# Patient Record
Sex: FEMALE | Race: BLACK OR AFRICAN AMERICAN | URBAN - METROPOLITAN AREA
[De-identification: names, ages, dates, MRNs, and addresses within clinical notes are randomized per-mention and may not be internally consistent; named-entity substitution may affect disease eponyms.]

---

## 2017-01-17 ENCOUNTER — MEDICAL CORRESPONDENCE (OUTPATIENT)
Dept: HEALTH INFORMATION MANAGEMENT | Facility: CLINIC | Age: 32
End: 2017-01-17

## 2017-01-23 ENCOUNTER — OFFICE VISIT (OUTPATIENT)
Dept: DERMATOLOGY | Facility: CLINIC | Age: 32
End: 2017-01-23

## 2017-01-23 DIAGNOSIS — E55.9 VITAMIN D DEFICIENCY: ICD-10-CM

## 2017-01-23 DIAGNOSIS — L60.8 MELANONYCHIA STRIATA: Primary | ICD-10-CM

## 2017-01-23 DIAGNOSIS — L60.8 MELANONYCHIA STRIATA: ICD-10-CM

## 2017-01-23 LAB
DEPRECATED CALCIDIOL+CALCIFEROL SERPL-MC: ABNORMAL UG/L (ref 20–75)
VIT B12 SERPL-MCNC: 469 PG/ML (ref 193–986)

## 2017-01-23 ASSESSMENT — PAIN SCALES - GENERAL: PAINLEVEL: NO PAIN (0)

## 2017-01-23 NOTE — PROGRESS NOTES
"[facility] Dermatology Note    Dermatology Problem List:  1. Melanonychia striata, Faint linear hyperpigmentation x 3 nails, labs ordered     CC:   Chief Complaint   Patient presents with     Derm Problem     Joan is here today for a fingernail check. Joan notes \" stripes.\"     Date of Service: Jan 23, 2017    History of Present Illness:  Ms. Joan Boyer is a 31 year old female who presents as a referral by Dr. Kolb an evaluation of fingernails. Today the patient reports that there are \"stripes\" on her nails. She states that she noticed it within the last month. It started on one nail and now its on a few. She has not noticed any change in the nail texture. She denies any issues with rashes or itching. She also notes that she has had tingling and numbness in her hands and her feet which comes and go. 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 Denies any history of skin diseases, expect an episode of impetigo when she was younger. She is not on any special diet and is not taking any supplementation. The patient reports no other lesions of concern at this time.    Otherwise, the patient reports no painful, bleeding, nonhealing, or pruritic lesions, and denies new or changing moles.    Past Medical History:   There is no problem list on file for this patient.    No past medical history on file.  No past surgical history on file.     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     Family History:  No family history of skin issues, except for keloids.     Medications:  Current Outpatient Prescriptions   Medication Sig Dispense Refill     levonorgestrel (MIRENA) 20 MCG/24HR IUD 1 each by Intrauterine route       Allergies:  Allergies   Allergen Reactions     Doxycycline GI Disturbance     Review of Systems:  - Skin: As above in HPI. No additional skin concerns.    Physical exam:  Vitals: There were no vitals taken for " this visit.  GEN: This is a well developed, well-nourished female in no acute distress, in a pleasant mood.      SKIN: Focused examination of the sun exposed areas, face, head, neck, arms. hands and feet and nails was performed. Significant for:   - Onychodystrophy to the 5th toenails.  - Faint linear hyperpigmentation on the left 2nd toe medially, right 4th digit, left 3rd digit  - No other lesions of concern on areas examined.     Impression/Plan:  1. Melanoncychia striata, Faint linear hyperpigmentation x 3 nails  -Reassurance. Discussed benign nature. ABCDs of melanoma were discussed and self skin checks were advised.  Return to clinic if noticing concerning features, (hyperpigmentation to nail fold, asymmetric of pigmentation, rapid growth etc).  - Pictures obtained at today's visit.   - Labs obtained today: Vitamin D, Vitamin B12, Vitamin B1 whole blood, Cortisol, NILESH  -This showed a vitamin D level less than 13, recommend weekly supplementation with 50,000 IU x 8 weeks. This was sent to her pharmacy.   -Follow up with primary care to follow this.     2. Onychodystrohpy to the 5th toenails, likely traumatic.   Follow-up prn pending lab results.    Staff Involved:  Staff Only    Scribe Disclosure:   I, Eduin Crocker, am serving as a scribe to document services personally performed by Sridevi Denny, based on data collection and the provider's statements to me.    Provider Disclosure:   I agree with above History, Review of Systems, Physical exam and Plan. I have reviewed the content of the documentation and have edited it as needed. I have personally performed the services documented here and the documentation accurately represents those services and the decisions I have made.     All risks, benefits and alternatives were discussed with patient.  Patient is in agreement and understands the assessment and plan.  All questions were answered.  Sun Screen Education was given.   Return to Clinic as needed.   Sridevi  Eduin GUNN

## 2017-01-23 NOTE — MR AVS SNAPSHOT
After Visit Summary   1/23/2017    Joan Boyer    MRN: [id number]           Patient Information     Date Of Birth          1985        Visit Information        Provider Department      1/23/2017 1:00 PM Sridevi Denny PA-C [facility] Dermatology        Today's Diagnoses     Melanlouisechia striata    -  1        Follow-ups after your visit        Your next 10 appointments already scheduled     Jan 23, 2017  2:00 PM   LAB with  LAB   [facility] ([facility])    [address] [place] [id number] [phone]           Patient must bring picture ID.  Patient should be prepared to give a urine specimen  Please do not eat 10-12 hours before your appointment if you are coming in fasting for labs on lipids, cholesterol, or glucose (sugar).  Pregnant women should follow their Care Team instructions. Water with medications is okay. Do not drink coffee or other fluids.   If you have concerns about taking  your medications, please ask at office or if scheduling via Nveloped, send a message by clicking on Secure Messaging, Message Your Care Team.              Who to contact     Please call your clinic at [phone] to:    Ask questions about your health    Make or cancel appointments    Discuss your medicines    Learn about your test results    Speak to your doctor   If you have compliments or concerns about an experience at your clinic, or if you wish to file a complaint, please contact [facility] Patient Relations at [phone] or email us at [email or web address]         Additional Information About Your Visit        Work in [employer] Information     Nveloped is an electronic gateway that provides easy, online access to your medical records. With Nveloped, you can request a clinic appointment, read your test results, renew a prescription or communicate with your care team.     To sign up for Nveloped visit the  website at [email or web address]   You will be asked to enter the access code listed below, as well as some personal information. Please follow the directions to create your username and password.     Your access code is: [email or web address]  Expires: 2017  6:30 AM     Your access code will  in 90 days. If you need help or a new code, please contact your [facility] or call [phone] for assistance.        Care EveryWhere ID     This is your Care EveryWhere ID. This could be used by other organizations to access your [place] medical records  [id number]         Blood Pressure from Last 3 Encounters:   No data found for BP    Weight from Last 3 Encounters:   No data found for Wt              Today, you had the following     No orders found for display       Primary Care Provider    None Specified       No primary provider on file.        Thank you!     Thank you for choosing [facility]  for your care. Our goal is always to provide you with excellent care. Hearing back from our patients is one way we can continue to improve our services. Please take a few minutes to complete the written survey that you may receive in the mail after your visit with us. Thank you!             Your Updated Medication List - Protect others around you: Learn how to safely use, store and throw away your medicines at www.disposemymeds.org.          This list is accurate as of: 17  1:33 PM.  Always use your most recent med list.                   Brand Name Dispense Instructions for use    levonorgestrel 20 MCG/24HR IUD    MIRENA     1 each by Intrauterine route

## 2017-01-23 NOTE — NURSING NOTE
"Dermatology Rooming Note    Joan Boyer's goals for this visit include:   Chief Complaint   Patient presents with     Derm Problem     Joan is here today for a fingernail check. Joan notes \" stripes.\"     Roz Whitaker LPN  "

## 2017-01-23 NOTE — Clinical Note
"1/23/2017       RE: Joan Boyer  [address]    [place] [facility] [address]     Dear Colleague,    Thank you for referring your patient, Joan Boyer, to the [facility] DERMATOLOGY at [facility]. Please see a copy of my visit note below.    [facility] Dermatology Note    Dermatology Problem List:  1. Melanonychia striata, Faint linear hyperpigmentation x 3 nails, labs ordered     CC:   Chief Complaint   Patient presents with     Derm Problem     Joan is here today for a fingernail check. Joan notes \" stripes.\"     Date of Service: Jan 23, 2017    History of Present Illness:  Ms. Joan Boyer is a 31 year old female who presents as a referral by Dr. Kolb an evaluation of fingernails. Today the patient reports that there are \"stripes\" on her nails. She states that she noticed it within the last month. It started on one nail and now its on a few. She has not noticed any change in the nail texture. She denies any issues with rashes or itching. She also notes that she has had tingling and numbness in her hands and her feet which comes and go. 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 Denies any history of skin diseases, expect an episode of impetigo when she was younger. She is not on any special diet and is not taking any supplementation. The patient reports no other lesions of concern at this time.    Otherwise, the patient reports no painful, bleeding, nonhealing, or pruritic lesions, and denies new or changing moles.    Past Medical History:   There is no problem list on file for this patient.    No past medical history on file.  No past surgical history on file.     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     Family History:  No family history of skin issues, except for keloids.     Medications:  Current Outpatient Prescriptions   Medication Sig Dispense " Refill     levonorgestrel (MIRENA) 20 MCG/24HR IUD 1 each by Intrauterine route       Allergies:  Allergies   Allergen Reactions     Doxycycline GI Disturbance     Review of Systems:  - Skin: As above in HPI. No additional skin concerns.    Physical exam:  Vitals: There were no vitals taken for this visit.  GEN: This is a well developed, well-nourished female in no acute distress, in a pleasant mood.      SKIN: Focused examination of the sun exposed areas, face, head, neck, arms. hands and feet and nails was performed. Significant for:   - Onychodystrophy to the 5th toenails.  - Faint linear hyperpigmentation on the left 2nd toe medially, right 4th digit, left 3rd digit  - No other lesions of concern on areas examined.     Impression/Plan:  1. Melanoncychia striata, Faint linear hyperpigmentation x 3 nails  -Reassurance. Discussed benign nature. ABCDs of melanoma were discussed and self skin checks were advised.  Return to clinic if noticing concerning features, (hyperpigmentation to nail fold, asymmetric of pigmentation, rapid growth etc).  - Pictures obtained at today's visit.   - Labs obtained today: Vitamin D, Vitamin B12, Vitamin B1 whole blood, Cortisol, NILESH  -This showed a vitamin D level less than 13, recommend weekly supplementation with 50,000 IU x 8 weeks. This was sent to her pharmacy.   -Follow up with primary care to follow this.     2. Onychodystrohpy to the 5th toenails, likely traumatic.   Follow-up prn pending lab results.    Staff Involved:  Staff Only    Scribe Disclosure:   I, Eduin Crocker, am serving as a scribe to document services personally performed by Sridevi Denny, based on data collection and the provider's statements to me.    Provider Disclosure:   I agree with above History, Review of Systems, Physical exam and Plan. I have reviewed the content of the documentation and have edited it as needed. I have personally performed the services documented here and the documentation  accurately represents those services and the decisions I have made.     All risks, benefits and alternatives were discussed with patient.  Patient is in agreement and understands the assessment and plan.  All questions were answered.  Sun Screen Education was given.   Return to Clinic as needed.       Sridevi Denny PA-C

## 2017-01-24 LAB — ANA SER QL IA: NORMAL

## 2017-01-25 DIAGNOSIS — L60.8 MELANONYCHIA STRIATA: ICD-10-CM

## 2017-01-25 LAB — CORTIS SERPL-MCNC: 13.3 UG/DL (ref 4–22)

## 2017-01-26 LAB — VIT B1 BLD-MCNC: 119 UG/DL

## 2017-01-29 RX ORDER — ERGOCALCIFEROL 1.25 MG/1
50000 CAPSULE, LIQUID FILLED ORAL
Qty: 8 CAPSULE | Refills: 0 | Status: SHIPPED | OUTPATIENT
Start: 2017-01-29 | End: 2017-03-20

## 2018-01-07 ENCOUNTER — HEALTH MAINTENANCE LETTER (OUTPATIENT)
Age: 33
End: 2018-01-07

## 2019-11-27 ENCOUNTER — OFFICE VISIT (OUTPATIENT)
Dept: DERMATOLOGY | Facility: CLINIC | Age: 34
End: 2019-11-27
Payer: COMMERCIAL

## 2019-11-27 DIAGNOSIS — L91.0 KELOID: Primary | ICD-10-CM

## 2019-11-27 RX ORDER — TRIAMCINOLONE ACETONIDE 40 MG/ML
40 INJECTION, SUSPENSION INTRA-ARTICULAR; INTRAMUSCULAR ONCE
Status: COMPLETED | OUTPATIENT
Start: 2019-11-27 | End: 2019-11-27

## 2019-11-27 RX ADMIN — TRIAMCINOLONE ACETONIDE 40 MG: 40 INJECTION, SUSPENSION INTRA-ARTICULAR; INTRAMUSCULAR at 07:25

## 2019-11-27 ASSESSMENT — PAIN SCALES - GENERAL: PAINLEVEL: NO PAIN (1)

## 2019-11-27 NOTE — LETTER
11/27/2019       RE: Joan Boyer  [address] [place] [place] [address]     Dear Colleague,    Thank you for referring your patient, Joan Boyer, to the [facility] DERMATOLOGY at [facility]. Please see a copy of my visit note below.    [facility] Dermatology Note    Dermatology Problem List:  1. Keloids, L postauricular helix and periumbilical  -s/p ILK 40 mg/cc to L posterior helix and 10 mg/cc to periumbilical keloids (11/27/19)  2. Melanonychia striata, Faint linear hyperpigmentation x 3 nails  -Labs (1/2017) Vitamin D, Vitamin B12, Vitamin B1 whole blood, Cortisol, NILESH were all within normal limits except for Vit D <13.  -previous tx: cholecalciferol 50,000 international unit(s) daily x8 weeks    Encounter Date: Nov 27, 2019    CC:   Chief Complaint   Patient presents with     Keloid     Joan is her today for keloid scar follow up. No Changes since last visit.      History of Present Illness:  Ms. Joan Boyer is a 34 year old female who presents for evaluation of keloids. She was previously seen by Sridevi Denny on 1/2017 for evaluation of melanonychia striata and was found to be Vit D deficient (<13).  At that time, she was recommended to supplement cholecalciferol 50,000 international unit(s) x8 weeks, which improved her melanonychia striata.  Today, she would like to address two keloids that have developed over the past 3-4 years.  The keloid around her umbilicus has been present for quite a few years and developed after a bellybutton piercing.  The other keloid that developed behind her L ear developed rapidly over the past year. She states that her earring would often get caught in her hair, which she thinks may have contributed to her keloid forming.  The most bothersome aspect of her keloids is the associated pruritus.  Approximately 10 years ago, she had a keloid on R lateral thigh that was treated with ILK, which significantly  improved her pruritus and flattened out her keloid.    No additional lesions of concerns that she would like to address today.      Past Medical History:   There is no problem list on file for this patient.    No past medical history on file.  No past surgical history on file.    Social History:  Patient reports that she has quit smoking. She has never used smokeless tobacco.   She has a PhD.    Family History:  Family History   Problem Relation Age of Onset     Melanoma No family hx of        Medications:  Current Outpatient Medications   Medication Sig Dispense Refill     levonorgestrel (MIRENA) 20 MCG/24HR IUD 1 each by Intrauterine route          Allergies   Allergen Reactions     Doxycycline GI Disturbance     Review of Systems:  -Constitutional: Otherwise feeling well today, in usual state of health.  -Skin: As above in HPI. No additional skin concerns.    Physical exam:  GEN: This is a well developed, well-nourished female in no acute distress, in a pleasant mood.    SKIN: Focused examination of the face and abdomen was performed.  -Hunter skin type: V  -On the L postauricular helix, there is a 0.8 cm, exophytic, firm, flesh-colored papule   -Along the supraumbilical abdomen, there is a dark brown, thin plaque with finger-like projections along the lateral aspect  -No other lesions of concern on areas examined.     Impression/Plan:  1. Keloids, L posterior helix and supraumbilical abdomen. Natural history and etiology discussed. Plan to trial ILK today.    Kenalog intralesional injection procedure note: After verbal consent and discussion of risks including but not limited to atrophy, pain, and bruising, time out was performed, the patient underwent positioning, 0.2 total cc of Kenalog 40 mg/cc was injected into 2 site(s) on the L postauricular helix. The patient tolerated the procedure well and left the Dermatology clinic in good condition.    Kenalog intralesional injection procedure note: After verbal  consent and discussion of risks including but not limited to atrophy, pain, and bruising, time out was performed, the patient underwent positioning, 0.5 total cc of Kenalog 10 mg/cc was injected into 4 site(s) on the supraumbilical abdomen. The patient tolerated the procedure well and left the Dermatology clinic in good condition.    Discussed with Ms. Boyer that it may take multiple injections to each of these keloids to improve her pruritus and thin the lesions. The keloid on her L posterior helix may not significantly decrease in size with ILK, but hopefully will help her pruritus. Additionally consideration for auricular keloid is surgical excision followed by repeat ILK injections. Also discussed that there may be increased risk of recurrence of keloid formation with excision as well.      Follow-up in 4-6 weeks, earlier for new or changing lesions.     Dr. Hughes staffed the patient.    Staff Involved:  Resident(Dr. Herrera)/Staff(Dr. Hughes)    Staff Physician Comments:   I saw and evaluated the patient with the resident and I agree with the assessment and plan.  I was present for the key portions of the above major procedure and examination.    Gisella Hughes MD    Department of Dermatology  [facility]: Phone: [phone], Fax: [phone]

## 2019-11-27 NOTE — PROGRESS NOTES
[facility] Dermatology Note    Dermatology Problem List:  1. Keloids, L postauricular helix and periumbilical  -s/p ILK 40 mg/cc to L posterior helix and 10 mg/cc to periumbilical keloids (11/27/19)  2. Melanonychia striata, Faint linear hyperpigmentation x 3 nails  -Labs (1/2017) Vitamin D, Vitamin B12, Vitamin B1 whole blood, Cortisol, NILESH were all within normal limits except for Vit D <13.  -previous tx: cholecalciferol 50,000 international unit(s) daily x8 weeks    Encounter Date: Nov 27, 2019    CC:   Chief Complaint   Patient presents with     Keloid     Joan is her today for keloid scar follow up. No Changes since last visit.      History of Present Illness:  Ms. Joan Boyer is a 34 year old female who presents for evaluation of keloids. She was previously seen by Sridevi Denny on 1/2017 for evaluation of melanonychia striata and was found to be Vit D deficient (<13).  At that time, she was recommended to supplement cholecalciferol 50,000 international unit(s) x8 weeks, which improved her melanonychia striata.  Today, she would like to address two keloids that have developed over the past 3-4 years.  The keloid around her umbilicus has been present for quite a few years and developed after a bellybutton piercing.  The other keloid that developed behind her L ear developed rapidly over the past year. She states that her earring would often get caught in her hair, which she thinks may have contributed to her keloid forming.  The most bothersome aspect of her keloids is the associated pruritus.  Approximately 10 years ago, she had a keloid on R lateral thigh that was treated with ILK, which significantly improved her pruritus and flattened out her keloid.    No additional lesions of concerns that she would like to address today.      Past Medical History:   There is no problem list on file for this patient.    No past medical history on file.  No past surgical history on  file.    Social History:  Patient reports that she has quit smoking. She has never used smokeless tobacco.   She has a PhD.    Family History:  Family History   Problem Relation Age of Onset     Melanoma No family hx of        Medications:  Current Outpatient Medications   Medication Sig Dispense Refill     levonorgestrel (MIRENA) 20 MCG/24HR IUD 1 each by Intrauterine route          Allergies   Allergen Reactions     Doxycycline GI Disturbance     Review of Systems:  -Constitutional: Otherwise feeling well today, in usual state of health.  -Skin: As above in HPI. No additional skin concerns.    Physical exam:  GEN: This is a well developed, well-nourished female in no acute distress, in a pleasant mood.    SKIN: Focused examination of the face and abdomen was performed.  -Hunter skin type: V  -On the L postauricular helix, there is a 0.8 cm, exophytic, firm, flesh-colored papule   -Along the supraumbilical abdomen, there is a dark brown, thin plaque with finger-like projections along the lateral aspect  -No other lesions of concern on areas examined.     Impression/Plan:  1. Keloids, L posterior helix and supraumbilical abdomen. Natural history and etiology discussed. Plan to trial ILK today.    Kenalog intralesional injection procedure note: After verbal consent and discussion of risks including but not limited to atrophy, pain, and bruising, time out was performed, the patient underwent positioning, 0.2 total cc of Kenalog 40 mg/cc was injected into 2 site(s) on the L postauricular helix. The patient tolerated the procedure well and left the Dermatology clinic in good condition.    Kenalog intralesional injection procedure note: After verbal consent and discussion of risks including but not limited to atrophy, pain, and bruising, time out was performed, the patient underwent positioning, 0.5 total cc of Kenalog 10 mg/cc was injected into 4 site(s) on the supraumbilical abdomen. The patient tolerated the  procedure well and left the Dermatology clinic in good condition.    Discussed with Ms. Boyer that it may take multiple injections to each of these keloids to improve her pruritus and thin the lesions. The keloid on her L posterior helix may not significantly decrease in size with ILK, but hopefully will help her pruritus. Additionally consideration for auricular keloid is surgical excision followed by repeat ILK injections. Also discussed that there may be increased risk of recurrence of keloid formation with excision as well.      Follow-up in 4-6 weeks, earlier for new or changing lesions.     Dr. Hughes staffed the patient.    Staff Involved:  Resident(Dr. Herrera)/Staff(Dr. Hughes)    Staff Physician Comments:   I saw and evaluated the patient with the resident and I agree with the assessment and plan.  I was present for the key portions of the above major procedure and examination.    Gisella Hughes MD    Department of Dermatology  [facility]: Phone: [phone], Fax: [phone]

## 2019-11-27 NOTE — NURSING NOTE
Drug Administration Record    Prior to injection, verified patient identity using patient's name and date of birth.  Due to injection administration, patient instructed to remain in clinic for 15 minutes  afterwards, and to report any adverse reaction to me immediately.    Drug Name: triamcinolone acetonide(kenalog)  Dose: 0.2 mL of triamcinolone 40mg/mL, 8 mg dose  Route administered: ID  NDC #: [id number]: Kenalog-40 ([address]-2)  Amount of waste(mL):0.8  Reason for waste: Single use vial    LOT #: [id number]  SITE: see provider notes  : [employer]  EXPIRATION DATE: 07/2021      Drug Administration Record    Prior to injection, verified patient identity using patient's name and date of birth.  Due to injection administration, patient instructed to remain in clinic for 15 minutes  afterwards, and to report any adverse reaction to me immediately.    Drug Name: triamcinolone acetonide(kenalog)  Dose: Units or 0.5 mL of triamcinolone 10mg/mL, 5 mg dose  Route administered: ID  NDC #: [id number]: Kenalog-10 (8606-0508-94)  Amount of waste(mL):4.5  Reason for waste: Multi dose vial    LOT #: [id number]  SITE: See provider note  : [place]  EXPIRATION DATE: May 2021

## 2019-11-27 NOTE — NURSING NOTE
Chief Complaint   Patient presents with     Keloid     Joan is her today for keloid scar follow up. No Changes since last visit.      Yue Acosta LPN

## 2020-03-10 ENCOUNTER — HEALTH MAINTENANCE LETTER (OUTPATIENT)
Age: 35
End: 2020-03-10

## 2020-12-27 ENCOUNTER — HEALTH MAINTENANCE LETTER (OUTPATIENT)
Age: 35
End: 2020-12-27

## 2021-04-24 ENCOUNTER — HEALTH MAINTENANCE LETTER (OUTPATIENT)
Age: 36
End: 2021-04-24

## 2021-10-09 ENCOUNTER — HEALTH MAINTENANCE LETTER (OUTPATIENT)
Age: 36
End: 2021-10-09

## 2022-05-16 ENCOUNTER — HEALTH MAINTENANCE LETTER (OUTPATIENT)
Age: 37
End: 2022-05-16

## 2022-09-11 ENCOUNTER — HEALTH MAINTENANCE LETTER (OUTPATIENT)
Age: 37
End: 2022-09-11

## 2023-06-03 ENCOUNTER — HEALTH MAINTENANCE LETTER (OUTPATIENT)
Age: 38
End: 2023-06-03

## (undated) RX ORDER — TRIAMCINOLONE ACETONIDE 40 MG/ML
INJECTION, SUSPENSION INTRA-ARTICULAR; INTRAMUSCULAR
Status: DISPENSED
Start: 2019-11-27